# Patient Record
Sex: MALE | Race: WHITE | Employment: FULL TIME | URBAN - METROPOLITAN AREA
[De-identification: names, ages, dates, MRNs, and addresses within clinical notes are randomized per-mention and may not be internally consistent; named-entity substitution may affect disease eponyms.]

---
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
## 2021-09-08 ENCOUNTER — HOSPITAL ENCOUNTER (EMERGENCY)
Age: 21
Discharge: HOME | End: 2021-09-09
Attending: EMERGENCY MEDICINE
Payer: MEDICAID

## 2021-09-08 DIAGNOSIS — Z20.822 SUSPECTED COVID-19 VIRUS INFECTION: Primary | ICD-10-CM

## 2021-09-08 LAB
DEPRECATED S PYO AG THROAT QL EIA: NEGATIVE
FLUAV AG NPH QL IA: NEGATIVE
FLUBV AG NOSE QL IA: NEGATIVE

## 2021-09-08 PROCEDURE — 87880 STREP A ASSAY W/OPTIC: CPT

## 2021-09-08 PROCEDURE — 99283 EMERGENCY DEPT VISIT LOW MDM: CPT

## 2021-09-08 PROCEDURE — U0005 INFEC AGEN DETEC AMPLI PROBE: HCPCS

## 2021-09-08 PROCEDURE — 74011250637 HC RX REV CODE- 250/637: Performed by: EMERGENCY MEDICINE

## 2021-09-08 PROCEDURE — 74011250636 HC RX REV CODE- 250/636: Performed by: EMERGENCY MEDICINE

## 2021-09-08 PROCEDURE — 87804 INFLUENZA ASSAY W/OPTIC: CPT

## 2021-09-08 RX ORDER — ACETAMINOPHEN 500 MG
1000 TABLET ORAL ONCE
Status: COMPLETED | OUTPATIENT
Start: 2021-09-08 | End: 2021-09-08

## 2021-09-08 RX ORDER — ONDANSETRON 4 MG/1
4 TABLET, ORALLY DISINTEGRATING ORAL
Status: COMPLETED | OUTPATIENT
Start: 2021-09-08 | End: 2021-09-08

## 2021-09-08 RX ORDER — DEXAMETHASONE 4 MG/1
10 TABLET ORAL
Status: COMPLETED | OUTPATIENT
Start: 2021-09-08 | End: 2021-09-08

## 2021-09-08 RX ADMIN — ONDANSETRON 4 MG: 4 TABLET, ORALLY DISINTEGRATING ORAL at 23:45

## 2021-09-08 RX ADMIN — DEXAMETHASONE 10 MG: 4 TABLET ORAL at 23:44

## 2021-09-08 RX ADMIN — ACETAMINOPHEN 1000 MG: 500 TABLET ORAL at 23:45

## 2021-09-08 NOTE — LETTER
[facility] EMERGENCY DEPT  [address] [facility] [address]  [phone]    Work/School Note    Date: 9/8/2021     To Whom It May concern:    Eugenia Veliz was evaulated by the following provider(s):  Attending Provider: Kahlil Nichole virus is suspected. Per the CDC guidelines we recommend home isolation until the following conditions are all met:    1. At least 10 days have passed since symptoms first appeared and  2. At least 24 hours have passed since last fever without the use of fever-reducing medications and  3.  Symptoms (e.g., cough, shortness of breath) have improved    Sincerely,          Duyen King

## 2021-09-08 NOTE — LETTER
Dear Neeraj Reyna Patient,    Thank you for entrusting us with your care. Here is how you can expect to hear about your test results:       If your COVID-19 test is positive, you will receive a phone call from a member     of our team, and your results will be available in [address], our secure patient     portal.     If your COVID-19 test is negative, your results will be available on [employer],       our secure patient portal. If your employer is requiring you to show proof of     your negative test result, you'll be able to download and print off the test result     record. If you already have a [employer], visit [employer] and click Log in to [employer] to view any test results. If you don't have a [employer], you can register online by visiting [employer] and clicking Sign up for [employer]. Thank you for choosing [facility] for your care.     [facility]

## 2021-09-08 NOTE — ED TRIAGE NOTES
Patient states that he went to work and then he got very weak and tired also has a cough and wheezing

## 2021-09-09 VITALS
HEART RATE: 77 BPM | OXYGEN SATURATION: 99 % | TEMPERATURE: 99.4 F | DIASTOLIC BLOOD PRESSURE: 69 MMHG | RESPIRATION RATE: 16 BRPM | HEIGHT: 68 IN | WEIGHT: 198 LBS | SYSTOLIC BLOOD PRESSURE: 108 MMHG | BODY MASS INDEX: 30.01 KG/M2

## 2021-09-09 LAB — SARS-COV-2, COV2: NORMAL

## 2021-09-09 NOTE — DISCHARGE INSTRUCTIONS
Your strep and flu test came back negative. You may have COVID-19. Please self isolate until your COVID-19 result comes back negative. You may receive your COVID-19 result in 3 or 4 days. Take Tylenol or ibuprofen for pains and fevers. Hydrate by mouth. Return to ED for shortness of breath, dehydration or any other concerning signs.

## 2021-09-09 NOTE — ED PROVIDER NOTES
EMERGENCY DEPARTMENT HISTORY AND PHYSICAL EXAM        Date: 9/8/2021  Patient Name: Chantelle Schaffer    History of Presenting Illness     Chief Complaint   Patient presents with    Generalized Body Aches       History Provided By: Patient    HPI: Chantelle Schaffer, 24 y.o. male with no medical problems who presents with body aches, cough, fever, wheezing, and chills. States that symptoms started last night. States he also has felt lightheaded and fatigued. Denies any difficulty breathing, exposure to COVID-19. He has not had his COVID-19 vaccine. PCP: None        Past History     Past Medical History:  History reviewed. No pertinent past medical history. Past Surgical History:  History reviewed. No pertinent surgical history. Family History:  History reviewed. No pertinent family history. Social History:  Social History     Tobacco Use    Smoking status: Never Smoker    Smokeless tobacco: Never Used   Substance Use Topics    Alcohol use: Never    Drug use: Never       Allergies:  No Known Allergies      Review of Systems   Review of Systems   Constitutional: Positive for chills, fatigue and fever. HENT: Negative for congestion. Eyes: Negative for visual disturbance. Respiratory: Positive for cough and wheezing. Negative for shortness of breath. Cardiovascular: Negative for chest pain. Gastrointestinal: Positive for abdominal pain and nausea. Negative for vomiting. Genitourinary: Negative for dysuria. Musculoskeletal: Negative for arthralgias. Skin: Negative for rash. Neurological: Negative for headaches. Physical Exam   Constitutional: No acute distress. Well-nourished. Skin: No rash. ENT: No rhinorrhea. No cough. Head is normocephalic and atraumatic. Eye: No proptosis or conjunctival injections. Respiratory: No apparent respiratory distress. Lung sounds are clear. Cardiovascular: Mild tachycardia with regular rhythm. No murmurs.   2+ radial pulses bilaterally. Gastrointestinal: Nondistended. Nontender abdomen. Musculoskeletal: No obvious bony deformities. Psychiatric: Cooperative. Appropriate mood and affect. Diagnostic Study Results     Labs -     Recent Results (from the past 24 hour(s))   STREP AG SCREEN, GROUP A    Collection Time: 09/08/21  7:45 PM    Specimen: Throat   Result Value Ref Range    Group A Strep Ag ID Negative         Radiologic Studies -   No orders to display     CT Results  (Last 48 hours)    None        CXR Results  (Last 48 hours)    None          Medical Decision Making and ED Course     I reviewed the available vital signs, nursing notes, past medical history, past surgical history, family history, and social history. Vital Signs - Reviewed the patient's vital signs. Patient Vitals for the past 12 hrs:   Temp Pulse Resp BP SpO2   09/08/21 1939 99.4 °F (37.4 °C) (!) 106 18 104/61 97 %     Medical Decision Making:   Presented with chills, wheezing, cough, and fatigue. The differential diagnosis is viral syndrome, COVID-19, influenza, strep patient likely has COVID-19. Will test for this and recommended quarantine. He has not been vaccinated. .  Influenza, strep are both negative. COVID-19 is pending. Did give him Decadron 10 mg orally as well as 4 mg Zofran ODT and 1 g of Tylenol while he was in the ER. He was reassured and discharged in good condition with significant return cautions including difficulty breathing. Disposition     Discharged home    DISCHARGE PLAN:  1. There are no discharge medications for this patient. 2.   Follow-up Information     Follow up With Specialties Details Why [address] [facility] EMERGENCY DEPT Emergency Medicine Go today As soon as possible if symptoms worsen [address] [facility] [address] [phone]    Primary care doctor  Schedule an appointment as soon as possible for a visit in 3 days          3.   Return to ED if worse     Diagnosis     Clinical impression:   1. Suspected COVID-19 virus infection       Attestation:  Please note that this dictation was completed with [employer], the computer voice recognition software. Quite often unanticipated grammatical, syntax, homophones, and other interpretive errors are inadvertently transcribed by the computer software. Please disregard these errors. Please excuse any errors that have escaped final proofreading. Thank you.   Jeannette Khalil, DO

## 2021-09-10 ENCOUNTER — PATIENT OUTREACH (OUTPATIENT)
Dept: CASE MANAGEMENT | Age: 21
End: 2021-09-10

## 2021-09-10 LAB
SARS-COV-2, XPLCVT: ABNORMAL
SOURCE, COVRS: ABNORMAL

## 2021-09-10 NOTE — PROGRESS NOTES
Patient contacted regarding COVID-19 diagnosis. Discussed COVID-19 related testing which was available at this time. Test results were positive. Patient informed of results, if available? yes. LPN Care Coordinator contacted the patient by telephone to perform post discharge assessment. Call within 2 business days of discharge: Yes Verified name and  with patient as identifiers. Provided introduction to self, and explanation of the CTN/ACM role, and reason for call due to risk factors for infection and/or exposure to COVID-19. Symptoms reviewed with patient who verbalized the following symptoms: fatigue, pain or aching joints, cough, shortness of breath and loss or taste or smell      Due to new onset of symptoms encounter was not routed to provider for escalation, patient was encouraged that if SOB got worse to return to ED. Discussed follow-up appointments. If no appointment was previously scheduled, appointment scheduling offered:  no. [facility] follow up appointment(s): No future appointments. Non[facility] follow up appointment(s): NA       Advance Care Planning:   Does patient have an Advance Directive: decision makers updated. Educated patient about risk for severe COVID-19 due to risk factors according to CDC guidelines. LPN CC reviewed discharge instructions, medical action plan and red flag symptoms with the patient who verbalized understanding. Discussed COVID vaccination status: no. Education provided on COVID-19 vaccination as appropriate. Discussed exposure protocols and quarantine with CDC Guidelines. Patient was given an opportunity to verbalize any questions and concerns and agrees to contact LPN CC or health care provider for questions related to their healthcare. Reviewed and educated patient on any new and changed medications related to discharge diagnosis     Was patient discharged with a pulse oximeter? no .    LPN CC provided contact information.  Plan for follow-up call in 5-7 days based on severity of symptoms and risk factors.

## 2021-09-17 ENCOUNTER — PATIENT OUTREACH (OUTPATIENT)
Dept: CASE MANAGEMENT | Age: 21
End: 2021-09-17

## 2021-09-17 NOTE — PROGRESS NOTES
Patient resolved from Transition of Care episode on 9/17/21. ACM/CTN was unsuccessful at contacting this patient today. Patient/family was provided the following resources and education related to COVID-19 during the initial call:                         Signs, symptoms and red flags related to COVID-19            CDC exposure and quarantine guidelines            [facility] exposure contact - [phone]            Contact for their local Department of Health                 Patient has not had any additional ED or hospital visits. No further outreach scheduled with this CTN/ACM. Episode of Care resolved. Patient has this CTN/ACM contact information if future needs arise.

## 2023-11-09 ENCOUNTER — HOSPITAL ENCOUNTER (EMERGENCY)
Facility: HOSPITAL | Age: 23
Discharge: HOME | End: 2023-11-09
Attending: EMERGENCY MEDICINE
Payer: MEDICAID

## 2023-11-09 ENCOUNTER — APPOINTMENT (OUTPATIENT)
Facility: HOSPITAL | Age: 23
End: 2023-11-09
Payer: MEDICAID

## 2023-11-09 VITALS
RESPIRATION RATE: 16 BRPM | OXYGEN SATURATION: 99 % | SYSTOLIC BLOOD PRESSURE: 115 MMHG | TEMPERATURE: 98.4 F | WEIGHT: 188 LBS | BODY MASS INDEX: 28.49 KG/M2 | HEART RATE: 61 BPM | DIASTOLIC BLOOD PRESSURE: 73 MMHG | HEIGHT: 68 IN

## 2023-11-09 DIAGNOSIS — K58.9 IRRITABLE BOWEL SYNDROME, UNSPECIFIED TYPE: Primary | ICD-10-CM

## 2023-11-09 LAB
ALBUMIN SERPL-MCNC: 4.4 G/DL (ref 3.5–5)
ALBUMIN/GLOB SERPL: 1.3 (ref 1.1–2.2)
ALP SERPL-CCNC: 64 U/L (ref 45–117)
ALT SERPL-CCNC: 63 U/L (ref 12–78)
ANION GAP SERPL CALC-SCNC: 5 MMOL/L (ref 5–15)
AST SERPL W P-5'-P-CCNC: 29 U/L (ref 15–37)
BASOPHILS # BLD: 0 K/UL (ref 0–0.1)
BASOPHILS NFR BLD: 0 % (ref 0–1)
BILIRUB SERPL-MCNC: 0.6 MG/DL (ref 0.2–1)
BUN SERPL-MCNC: 18 MG/DL (ref 6–20)
BUN/CREAT SERPL: 15 (ref 12–20)
CA-I BLD-MCNC: 9.6 MG/DL (ref 8.5–10.1)
CHLORIDE SERPL-SCNC: 106 MMOL/L (ref 97–108)
CO2 SERPL-SCNC: 28 MMOL/L (ref 21–32)
CREAT SERPL-MCNC: 1.24 MG/DL (ref 0.7–1.3)
DIFFERENTIAL METHOD BLD: ABNORMAL
EOSINOPHIL # BLD: 0.1 K/UL (ref 0–0.4)
EOSINOPHIL NFR BLD: 1 % (ref 0–7)
ERYTHROCYTE [DISTWIDTH] IN BLOOD BY AUTOMATED COUNT: 11.6 % (ref 11.5–14.5)
GLOBULIN SER CALC-MCNC: 3.4 G/DL (ref 2–4)
GLUCOSE SERPL-MCNC: 96 MG/DL (ref 65–100)
HCT VFR BLD AUTO: 43.9 % (ref 36.6–50.3)
HGB BLD-MCNC: 14.8 G/DL (ref 12.1–17)
IMM GRANULOCYTES # BLD AUTO: 0 K/UL (ref 0–0.04)
IMM GRANULOCYTES NFR BLD AUTO: 0 % (ref 0–0.5)
LIPASE SERPL-CCNC: 35 U/L (ref 13–75)
LYMPHOCYTES # BLD: 2.2 K/UL (ref 0.8–3.5)
LYMPHOCYTES NFR BLD: 19 % (ref 12–49)
MCH RBC QN AUTO: 28.5 PG (ref 26–34)
MCHC RBC AUTO-ENTMCNC: 33.7 G/DL (ref 30–36.5)
MCV RBC AUTO: 84.6 FL (ref 80–99)
MONOCYTES # BLD: 0.8 K/UL (ref 0–1)
MONOCYTES NFR BLD: 7 % (ref 5–13)
NEUTS SEG # BLD: 8.4 K/UL (ref 1.8–8)
NEUTS SEG NFR BLD: 73 % (ref 32–75)
NRBC # BLD: 0 K/UL (ref 0–0.01)
NRBC BLD-RTO: 0 PER 100 WBC
PLATELET # BLD AUTO: 360 K/UL (ref 150–400)
PMV BLD AUTO: 9.3 FL (ref 8.9–12.9)
POTASSIUM SERPL-SCNC: 4.4 MMOL/L (ref 3.5–5.1)
PROT SERPL-MCNC: 7.8 G/DL (ref 6.4–8.2)
RBC # BLD AUTO: 5.19 M/UL (ref 4.1–5.7)
SODIUM SERPL-SCNC: 139 MMOL/L (ref 136–145)
WBC # BLD AUTO: 11.6 K/UL (ref 4.1–11.1)

## 2023-11-09 PROCEDURE — 36415 COLL VENOUS BLD VENIPUNCTURE: CPT

## 2023-11-09 PROCEDURE — 83690 ASSAY OF LIPASE: CPT

## 2023-11-09 PROCEDURE — 85025 COMPLETE CBC W/AUTO DIFF WBC: CPT

## 2023-11-09 PROCEDURE — 80053 COMPREHEN METABOLIC PANEL: CPT

## 2023-11-09 PROCEDURE — 99285 EMERGENCY DEPT VISIT HI MDM: CPT

## 2023-11-09 PROCEDURE — 74177 CT ABD & PELVIS W/CONTRAST: CPT

## 2023-11-09 PROCEDURE — 6360000004 HC RX CONTRAST MEDICATION: Performed by: EMERGENCY MEDICINE

## 2023-11-09 RX ORDER — DICYCLOMINE HCL 20 MG
20 TABLET ORAL 4 TIMES DAILY
Qty: 10 TABLET | Refills: 0 | Status: SHIPPED | OUTPATIENT
Start: 2023-11-09

## 2023-11-09 RX ADMIN — IOPAMIDOL 100 ML: 755 INJECTION, SOLUTION INTRAVENOUS at 19:08

## 2023-11-09 ASSESSMENT — PAIN SCALES - GENERAL: PAINLEVEL_OUTOF10: 9

## 2023-11-09 ASSESSMENT — PAIN DESCRIPTION - LOCATION: LOCATION: ABDOMEN

## 2023-11-09 ASSESSMENT — PAIN - FUNCTIONAL ASSESSMENT: PAIN_FUNCTIONAL_ASSESSMENT: NONE - DENIES PAIN

## 2023-11-09 NOTE — ED TRIAGE NOTES
Pt complains of abd pain that started today. Denies N/V/D. [facility] due to elevated WBC.

## 2023-11-09 NOTE — ED PROVIDER NOTES
unspecified type          DISPOSITION/PLAN   DISPOSITION Decision To Discharge 11/09/2023 08:44:42 PM    Discharge Note: The patient is stable for discharge home. The signs, symptoms, diagnosis, and discharge instructions have been discussed, understanding conveyed, and agreed upon. The patient is to follow up as recommended or return to ER should their symptoms worsen. PATIENT REFERRED TO:  [facility] EMERGENCY DEPT  [address] [facility]  [phone]  Call in 2 days      Karen Turcios [address] [place] [address]  [phone]    Call in 2 days  As needed, If symptoms worsen        DISCHARGE MEDICATIONS:     Medication List        START taking these medications      dicyclomine 20 MG tablet  Commonly known as: BENTYL  Take 1 tablet by mouth 4 times daily               Where to Get Your Medications        These medications were sent to [facility] Yudith Rayo [phone] [address], [place] include [address] beds at the [facility] [address]      Phone: [phone]   dicyclomine 20 MG tablet           DISCONTINUED MEDICATIONS:  Current Discharge Medication List          I am the Primary Clinician of Record. Aisha Cade MD (electronically signed)    (Please note that parts of this dictation were completed with voice recognition software. Quite often unanticipated grammatical, syntax, homophones, and other interpretive errors are inadvertently transcribed by the computer software. Please disregards these errors.  Please excuse any errors that have escaped final proofreading.)     Bridgett Evangelista MD  11/09/23 2052